# Patient Record
Sex: FEMALE | Race: WHITE | NOT HISPANIC OR LATINO | URBAN - METROPOLITAN AREA
[De-identification: names, ages, dates, MRNs, and addresses within clinical notes are randomized per-mention and may not be internally consistent; named-entity substitution may affect disease eponyms.]

---

## 2020-10-15 ENCOUNTER — OFFICE VISIT (OUTPATIENT)
Dept: URBAN - METROPOLITAN AREA CLINIC 121 | Facility: CLINIC | Age: 50
End: 2020-10-15

## 2021-04-27 ENCOUNTER — OFFICE VISIT (OUTPATIENT)
Dept: URBAN - METROPOLITAN AREA CLINIC 60 | Facility: CLINIC | Age: 51
End: 2021-04-27

## 2021-06-04 ENCOUNTER — OFFICE VISIT (OUTPATIENT)
Dept: URBAN - METROPOLITAN AREA SURGERY CENTER 4 | Facility: SURGERY CENTER | Age: 51
End: 2021-06-04

## 2021-06-08 LAB — PATHOLOGY (INDENTED REPORT): (no result)

## 2021-06-24 ENCOUNTER — OFFICE VISIT (OUTPATIENT)
Dept: URBAN - METROPOLITAN AREA TELEHEALTH 2 | Facility: TELEHEALTH | Age: 51
End: 2021-06-24

## 2022-07-09 ENCOUNTER — TELEPHONE ENCOUNTER (OUTPATIENT)
Dept: URBAN - METROPOLITAN AREA CLINIC 121 | Facility: CLINIC | Age: 52
End: 2022-07-09

## 2022-07-09 RX ORDER — DULOXETINE HCL 20 MG
CAPSULE,DELAYED RELEASE (ENTERIC COATED) ORAL
Refills: 0 | OUTPATIENT
Start: 2013-11-04 | End: 2014-03-04

## 2022-07-09 RX ORDER — LEVOTHYROXINE SODIUM 100 UG/1
TABLET ORAL
Refills: 0 | OUTPATIENT
Start: 2013-10-25 | End: 2014-03-04

## 2022-07-09 RX ORDER — PANTOPRAZOLE SODIUM 20 MG
TABLET, DELAYED RELEASE (ENTERIC COATED) ORAL TAKE AS DIRECTED
Refills: 0 | OUTPATIENT
Start: 2011-11-14 | End: 2013-11-04

## 2022-07-09 RX ORDER — RABEPRAZOLE SODIUM 20 MG/1
TABLET, DELAYED RELEASE ORAL ONCE A DAY
Refills: 1 | OUTPATIENT
Start: 2011-12-28 | End: 2011-12-28

## 2022-07-09 RX ORDER — SUCRALFATE 1 G/1
TAKE 1 TABLET BY MOUTH THREE TIMES DAILY FOR 1 MONTH. MAY DISSOLVE TABLET IN 2 TO 3 TABLESPOONSFUL OF WATER AND DRINK AS NEEDED TABLET ORAL
Refills: 1 | OUTPATIENT
Start: 2021-10-04 | End: 2021-12-02

## 2022-07-09 RX ORDER — ROPINIROLE 0.25 MG/1
QHS TABLET, FILM COATED ORAL
Refills: 0 | OUTPATIENT
Start: 2017-06-06 | End: 2018-03-01

## 2022-07-09 RX ORDER — CYCLOBENZAPRINE HYDROCHLORIDE 10 MG/1
TABLET, FILM COATED ORAL AS NEEDED
Refills: 0 | OUTPATIENT
Start: 2017-08-15 | End: 2018-03-01

## 2022-07-09 RX ORDER — RABEPRAZOLE SODIUM 20 MG/1
TABLET, DELAYED RELEASE ORAL ONCE A DAY
Refills: 2 | OUTPATIENT
Start: 2012-03-06 | End: 2012-08-30

## 2022-07-09 RX ORDER — SUCRALFATE 1 G/1
TAKE 1 TAB PO FOUR TIMES A DAY, AC & HS TABLET ORAL
Refills: 1 | OUTPATIENT
Start: 2018-03-15 | End: 2021-04-27

## 2022-07-09 RX ORDER — BUPROPION HYDROCHLORIDE 100 MG/1
TABLET, FILM COATED ORAL
Refills: 0 | OUTPATIENT
Start: 2013-10-21 | End: 2014-03-04

## 2022-07-09 RX ORDER — METFORMIN HCL 500 MG/1
TABLET ORAL
Refills: 0 | OUTPATIENT
Start: 2012-09-21 | End: 2013-11-04

## 2022-07-09 RX ORDER — SUCRALFATE 1 G/1
TAKE 1 TAB PO TID X 1 MONTH - CAN DISSOLVE IN 2-3 TBSP WATER AND DRINK ***** TABLET ORAL THREE TIMES A DAY
Refills: 0 | OUTPATIENT
Start: 2021-06-24 | End: 2021-10-04

## 2022-07-09 RX ORDER — FAMOTIDINE 20 MG
TABLET ORAL
Refills: 0 | OUTPATIENT
Start: 2014-03-04 | End: 2014-06-23

## 2022-07-09 RX ORDER — NABUMETONE 500 MG/1
TABLET ORAL
Refills: 0 | OUTPATIENT
Start: 2012-09-21 | End: 2013-11-04

## 2022-07-09 RX ORDER — SERTRALINE 50 MG/1
TABLET, FILM COATED ORAL
Refills: 0 | OUTPATIENT
Start: 2021-04-11 | End: 2021-06-24

## 2022-07-09 RX ORDER — PHENTERMINE HYDROCHLORIDE 37.5 MG/1
TABLET ORAL
Refills: 0 | OUTPATIENT
Start: 2021-04-05 | End: 2021-06-24

## 2022-07-09 RX ORDER — RABEPRAZOLE SODIUM 20 MG/1
TAKE ONE TAB PO BID TABLET, DELAYED RELEASE ORAL TWICE A DAY
Refills: 6 | OUTPATIENT
Start: 2014-02-03 | End: 2014-06-23

## 2022-07-09 RX ORDER — RABEPRAZOLE SODIUM 20 MG/1
TAKE 30-60 MINS BEFORE BREAKFAST AND BEFORE DINNER TABLET, DELAYED RELEASE ORAL TWICE A DAY
Refills: 3 | OUTPATIENT
Start: 2012-09-21 | End: 2013-08-22

## 2022-07-09 RX ORDER — DULOXETINE HCL 20 MG
CAPSULE,DELAYED RELEASE (ENTERIC COATED) ORAL
Refills: 0 | OUTPATIENT
Start: 2012-09-21 | End: 2013-11-04

## 2022-07-09 RX ORDER — DENOSUMAB 60 MG/ML
EVERY 6 MONTH INJECTION SUBCUTANEOUS
Refills: 0 | OUTPATIENT
Start: 2021-04-27 | End: 2021-06-24

## 2022-07-09 RX ORDER — NYSTATIN 100000 [USP'U]/G
APPLY A SMALL AMOUNT TOPICALLY PERIANALLY, THREE TIMES A DAY CREAM ORAL; TOPICAL THREE TIMES A DAY
Refills: 0 | OUTPATIENT
Start: 2021-04-27 | End: 2021-06-24

## 2022-07-09 RX ORDER — RABEPRAZOLE SODIUM 20 MG/1
TAKE ONE TAB PO BID TABLET, DELAYED RELEASE ORAL TWICE A DAY
Refills: 11 | OUTPATIENT
Start: 2013-12-23 | End: 2014-03-04

## 2022-07-09 RX ORDER — CLONAZEPAM 0.5 MG/1
TABLET ORAL
Refills: 0 | OUTPATIENT
Start: 2013-08-27 | End: 2014-03-04

## 2022-07-10 ENCOUNTER — TELEPHONE ENCOUNTER (OUTPATIENT)
Dept: URBAN - METROPOLITAN AREA CLINIC 121 | Facility: CLINIC | Age: 52
End: 2022-07-10

## 2022-07-10 RX ORDER — ROPINIROLE 0.25 MG/1
QHS TABLET, FILM COATED ORAL
Refills: 0 | Status: ACTIVE | COMMUNITY
Start: 2018-03-01

## 2022-07-10 RX ORDER — FAMOTIDINE 40 MG/1
TAKE 1 TAB PO QD TABLET, FILM COATED ORAL ONCE A DAY
Refills: 3 | Status: ACTIVE | COMMUNITY
Start: 2021-06-24

## 2022-07-10 RX ORDER — SERTRALINE 50 MG/1
TABLET, FILM COATED ORAL ONCE A DAY
Refills: 0 | Status: ACTIVE | COMMUNITY
Start: 2021-06-24

## 2022-07-10 RX ORDER — PHENTERMINE HYDROCHLORIDE 37.5 MG/1
TABLET ORAL ONCE A DAY
Refills: 0 | Status: ACTIVE | COMMUNITY
Start: 2021-06-24

## 2022-07-10 RX ORDER — SUCRALFATE 1 G/1
TAKE 1 TABLET BY MOUTH FOUR TIMES DAILY. MAY DISSOLVE TABLET IN 2 TO 3 TABLESPOONSFUL OF WATER AND DRINK AS NEEDED TABLET ORAL
Refills: 0 | Status: ACTIVE | COMMUNITY
Start: 2021-12-02

## 2022-07-10 RX ORDER — SUCRALFATE 1 G/1
TABLET ORAL
Refills: 0 | Status: ACTIVE | COMMUNITY
Start: 2011-12-01

## 2022-07-10 RX ORDER — DENOSUMAB 60 MG/ML
EVERY 6 MONTHS INJECTION SUBCUTANEOUS TAKE AS DIRECTED
Refills: 0 | Status: ACTIVE | COMMUNITY
Start: 2021-06-24

## 2022-07-10 RX ORDER — ONDANSETRON 8 MG/1
AS NEEDED TABLET, ORALLY DISINTEGRATING ORAL TWICE A DAY
Refills: 0 | Status: ACTIVE | COMMUNITY
Start: 2014-06-23

## 2022-07-10 RX ORDER — RABEPRAZOLE SODIUM 20 MG/1
TAKE 30-60 MINS BEFORE BREAKFAST AND BEFORE DINNER TABLET, DELAYED RELEASE ORAL TWICE A DAY
Refills: 3 | Status: ACTIVE | COMMUNITY
Start: 2013-08-22

## 2022-07-10 RX ORDER — PROMETHAZINE HYDROCHLORIDE 12.5 MG/1
1 TAB PO Q6H PRN NAUSEA/VOMITING TABLET ORAL
Refills: 0 | Status: ACTIVE | COMMUNITY
Start: 2012-08-30

## 2022-07-10 RX ORDER — RABEPRAZOLE SODIUM 20 MG/1
TAKE 1 TABLET BY MOUTH ONCE A DAY TABLET, DELAYED RELEASE ORAL
Refills: 1 | Status: ACTIVE | COMMUNITY
Start: 2012-08-30

## 2022-07-10 RX ORDER — SUCRALFATE 1 G/10ML
TAKE 10ML QID - AC & HS SUSPENSION ORAL
Refills: 0 | Status: ACTIVE | COMMUNITY
Start: 2012-09-21

## 2022-07-30 ENCOUNTER — TELEPHONE ENCOUNTER (OUTPATIENT)
Age: 52
End: 2022-07-30

## 2022-07-31 ENCOUNTER — TELEPHONE ENCOUNTER (OUTPATIENT)
Age: 52
End: 2022-07-31

## 2023-01-08 ENCOUNTER — TELEPHONE ENCOUNTER (OUTPATIENT)
Dept: URBAN - METROPOLITAN AREA CLINIC 63 | Facility: CLINIC | Age: 53
End: 2023-01-08

## 2023-01-10 ENCOUNTER — OFFICE VISIT (OUTPATIENT)
Dept: URBAN - METROPOLITAN AREA CLINIC 60 | Facility: CLINIC | Age: 53
End: 2023-01-10
Payer: COMMERCIAL

## 2023-01-10 ENCOUNTER — WEB ENCOUNTER (OUTPATIENT)
Dept: URBAN - METROPOLITAN AREA CLINIC 60 | Facility: CLINIC | Age: 53
End: 2023-01-10

## 2023-01-10 VITALS
DIASTOLIC BLOOD PRESSURE: 84 MMHG | HEART RATE: 95 BPM | WEIGHT: 146 LBS | TEMPERATURE: 97.6 F | BODY MASS INDEX: 28.66 KG/M2 | SYSTOLIC BLOOD PRESSURE: 124 MMHG | OXYGEN SATURATION: 92 % | HEIGHT: 60 IN

## 2023-01-10 DIAGNOSIS — E61.1 LOW IRON: ICD-10-CM

## 2023-01-10 PROCEDURE — 99214 OFFICE O/P EST MOD 30 MIN: CPT | Performed by: NURSE PRACTITIONER

## 2023-01-10 RX ORDER — FAMOTIDINE 40 MG/1
TAKE 1 TAB PO QD TABLET, FILM COATED ORAL ONCE A DAY
Refills: 3 | Status: ACTIVE | COMMUNITY
Start: 2021-06-24

## 2023-01-10 RX ORDER — DENOSUMAB 60 MG/ML
EVERY 6 MONTHS INJECTION SUBCUTANEOUS TAKE AS DIRECTED
Refills: 0 | Status: ACTIVE | COMMUNITY
Start: 2021-06-24

## 2023-01-10 RX ORDER — ROPINIROLE 0.25 MG/1
QHS TABLET, FILM COATED ORAL
Refills: 0 | Status: ACTIVE | COMMUNITY
Start: 2018-03-01

## 2023-01-10 RX ORDER — IRON FUM,PS CMP/VIT C/NIACIN 125-40-3MG
1 CAPSULE BETWEEN MEALS CAPSULE ORAL ONCE A DAY
Status: ACTIVE | COMMUNITY

## 2023-01-10 NOTE — HPI-TODAY'S VISIT:
This is a 52-year-old female patient who presents to the office referred for abnormal lab work/low Ferritin.  She has a family history of colon cancer, a personal history of adenomatous polyps and a prior history of chronic GERD, findings of focal Zuñiga's in 2014, history of Nissen.  She was last seen in June 2021.  She saw her Hematologist, whom she has seen chronically for elevated Platelets. Additional labs were ordered and ferritin rechecked. She was advised by her Hematologist to see us for assessment of her GI track. She states that her Hematologist feels there could be a GI blood loss. She has started her on Integra.   Today she denies any GI symptoms. No abdominal pain, rectal bleeding, dark stools, frequent heartburn, nausea, appetite change or weight loss.

## 2023-01-10 NOTE — HPI-PREVIOUS PROCEDURES
She has severe osteoporosis with prior vertebral fractures and is unable to take PPI medication.  When last seen in May 2021 we reviewed her EGD/colonoscopy.  EGD revealed visualization of Zuñiga's type mucosa although biopsies did not reveal intestinal metaplasia on that occasion.  Chronic gastritis was noted with a few shallow erosions.  There was evidence of a Nissen fundoplication and the wrap appeared intact.

## 2023-01-10 NOTE — HPI-PREVIOUS LABS
Today she reports that routine labs in November revealed a low ferritin level of 9. Her Ferritin recheck yesterday revealed Ferritin of 16, iron saturation 15%, total iron 52. Her Hgb has remained low normal when checked twice in November and yesterday (12.8, 13.3). FOBT on 11/18/22 was negative.

## 2023-02-23 ENCOUNTER — TELEPHONE ENCOUNTER (OUTPATIENT)
Dept: URBAN - METROPOLITAN AREA CLINIC 63 | Facility: CLINIC | Age: 53
End: 2023-02-23

## 2023-03-07 ENCOUNTER — OFFICE VISIT (OUTPATIENT)
Dept: URBAN - METROPOLITAN AREA CLINIC 60 | Facility: CLINIC | Age: 53
End: 2023-03-07
Payer: COMMERCIAL

## 2023-03-07 ENCOUNTER — DASHBOARD ENCOUNTERS (OUTPATIENT)
Age: 53
End: 2023-03-07

## 2023-03-07 VITALS
OXYGEN SATURATION: 99 % | HEART RATE: 102 BPM | TEMPERATURE: 97.7 F | BODY MASS INDEX: 28.43 KG/M2 | SYSTOLIC BLOOD PRESSURE: 126 MMHG | HEIGHT: 60 IN | DIASTOLIC BLOOD PRESSURE: 82 MMHG | WEIGHT: 144.8 LBS

## 2023-03-07 DIAGNOSIS — E61.1 LOW IRON: ICD-10-CM

## 2023-03-07 PROCEDURE — 99213 OFFICE O/P EST LOW 20 MIN: CPT | Performed by: NURSE PRACTITIONER

## 2023-03-07 RX ORDER — ROPINIROLE 0.25 MG/1
QHS TABLET, FILM COATED ORAL
Refills: 0 | Status: ACTIVE | COMMUNITY
Start: 2018-03-01

## 2023-03-07 RX ORDER — IRON FUM,PS CMP/VIT C/NIACIN 125-40-3MG
1 CAPSULE BETWEEN MEALS CAPSULE ORAL ONCE A DAY
Status: ACTIVE | COMMUNITY

## 2023-03-07 RX ORDER — FAMOTIDINE 40 MG/1
TAKE 1 TAB PO QD TABLET, FILM COATED ORAL ONCE A DAY
Refills: 3 | Status: ACTIVE | COMMUNITY
Start: 2021-06-24

## 2023-03-07 RX ORDER — DENOSUMAB 60 MG/ML
EVERY 6 MONTHS INJECTION SUBCUTANEOUS TAKE AS DIRECTED
Refills: 0 | Status: ACTIVE | COMMUNITY
Start: 2021-06-24

## 2023-03-07 NOTE — HPI-PREVIOUS PROCEDURES
Her last EGD/colonoscopy were performed in June 2021.  EGD revealed visualization of Zuñiga's type mucosa although biopsies did not confirm intestinal metaplasia on that occasion.  Chronic gastritis was noted with a few shallow erosions.  There was evidence of a Nissen fundoplication and the wrap appeared intact.  Colonoscopy revealed a medium submucosal nodule in the cecum versus extrinsic compression/looping from the scope.  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  A 6 mm sessile tubular adenoma was removed from the cecum.  A 6 mm tubular adenoma was removed from the proximal ascending colon and a tattoo seen in the descending colon at the site of her prior large adenomatous polypectomy.  There was a small raised area proximal to the tattoo and this was removed and reported to be benign mucosa.  A 4 mm flat hyperplastic polyp was removed from the distal sigmoid colon and a 5 mm tubular adenoma removed from the rectum.

## 2023-03-07 NOTE — HPI-TODAY'S VISIT:
This is a 52-year-old female patient who presents to the office for follow-up after recent testing.  Her small bowel follow-through is normal.  The duodenum, jejunum, ileum/terminal ileum all appear of normal caliber and mucosal pattern.  There was some delay in transit time, but no definite abnormality seen within the small bowel.  Today she reports doing fairly well. She is still taking Integra prescribed by her Hematologist, Dr ROBB Zazueta and reports some bowel urgency occurring since she started this medication. Her recent labs from late Feb report improvement in Hgb to 13.6, Ferritin increased to 27 (17), iron saturation 21% (15%). The plan is to repeat her labs in May.  She was seen in the office on 1/10/2023 referred by her Hematologist for low ferritin and mildly low iron saturation.  She sees them for chronically elevated platelets.  It was noted that her iron levels were mildly low and Dr. Zazueta wanted her evaluated for GI cause.  Her hemoglobin was low normal, she was heme-negative in the office and asymptomatic.

## 2025-08-29 ENCOUNTER — OFFICE VISIT (OUTPATIENT)
Dept: URBAN - METROPOLITAN AREA CLINIC 60 | Facility: CLINIC | Age: 55
End: 2025-08-29
Payer: COMMERCIAL

## 2025-08-29 ENCOUNTER — LAB OUTSIDE AN ENCOUNTER (OUTPATIENT)
Dept: URBAN - METROPOLITAN AREA CLINIC 60 | Facility: CLINIC | Age: 55
End: 2025-08-29

## 2025-08-29 DIAGNOSIS — R79.0 LOW FERRITIN LEVEL: ICD-10-CM

## 2025-08-29 DIAGNOSIS — Z86.0101 PERSONAL HISTORY OF ADENOMATOUS AND SERRATED COLON POLYPS: ICD-10-CM

## 2025-08-29 PROCEDURE — 99214 OFFICE O/P EST MOD 30 MIN: CPT | Performed by: PHYSICIAN ASSISTANT

## 2025-08-29 RX ORDER — DENOSUMAB 60 MG/ML
EVERY 6 MONTHS INJECTION SUBCUTANEOUS TAKE AS DIRECTED
Refills: 0 | Status: ACTIVE | COMMUNITY
Start: 2021-06-24

## 2025-08-29 RX ORDER — IRON FUM,PS CMP/VIT C/NIACIN 125-40-3MG
1 CAPSULE BETWEEN MEALS CAPSULE ORAL ONCE A DAY
Status: ACTIVE | COMMUNITY

## 2025-08-29 RX ORDER — ROPINIROLE 0.25 MG/1
QHS TABLET, FILM COATED ORAL
Refills: 0 | Status: ACTIVE | COMMUNITY
Start: 2018-03-01

## 2025-08-29 RX ORDER — SEMAGLUTIDE 1 MG/.5ML
0.5 ML INJECTION, SOLUTION SUBCUTANEOUS
Status: ACTIVE | COMMUNITY